# Patient Record
Sex: MALE | Race: WHITE | NOT HISPANIC OR LATINO | Employment: FULL TIME | URBAN - METROPOLITAN AREA
[De-identification: names, ages, dates, MRNs, and addresses within clinical notes are randomized per-mention and may not be internally consistent; named-entity substitution may affect disease eponyms.]

---

## 2017-02-06 RX ORDER — NEBIVOLOL HYDROCHLORIDE 10 MG/1
TABLET ORAL
Qty: 90 TABLET | Refills: 1 | Status: SHIPPED | OUTPATIENT
Start: 2017-02-06 | End: 2017-02-07 | Stop reason: SDUPTHER

## 2017-02-07 RX ORDER — NEBIVOLOL 10 MG/1
10 TABLET ORAL DAILY
Qty: 90 TABLET | Refills: 1 | Status: SHIPPED | OUTPATIENT
Start: 2017-02-07 | End: 2017-08-14 | Stop reason: SDUPTHER

## 2017-06-09 RX ORDER — ATORVASTATIN CALCIUM 40 MG/1
40 TABLET, FILM COATED ORAL DAILY
Qty: 90 TABLET | Refills: 1 | Status: SHIPPED | OUTPATIENT
Start: 2017-06-09 | End: 2017-12-13 | Stop reason: SDUPTHER

## 2017-08-14 RX ORDER — NEBIVOLOL 10 MG/1
10 TABLET ORAL DAILY
Qty: 90 TABLET | Refills: 1 | Status: SHIPPED | OUTPATIENT
Start: 2017-08-14 | End: 2018-02-19 | Stop reason: SDUPTHER

## 2017-10-03 ENCOUNTER — OFFICE VISIT (OUTPATIENT)
Dept: CARDIOLOGY | Facility: CLINIC | Age: 62
End: 2017-10-03

## 2017-10-03 VITALS
SYSTOLIC BLOOD PRESSURE: 158 MMHG | HEART RATE: 82 BPM | BODY MASS INDEX: 39.62 KG/M2 | DIASTOLIC BLOOD PRESSURE: 90 MMHG | WEIGHT: 246.5 LBS | HEIGHT: 66 IN

## 2017-10-03 DIAGNOSIS — I10 ESSENTIAL HYPERTENSION: ICD-10-CM

## 2017-10-03 DIAGNOSIS — R60.0 LOCALIZED EDEMA: ICD-10-CM

## 2017-10-03 DIAGNOSIS — I25.10 CORONARY ARTERY DISEASE INVOLVING NATIVE CORONARY ARTERY OF NATIVE HEART WITHOUT ANGINA PECTORIS: Primary | ICD-10-CM

## 2017-10-03 PROCEDURE — 99214 OFFICE O/P EST MOD 30 MIN: CPT | Performed by: INTERNAL MEDICINE

## 2017-10-03 RX ORDER — NITROGLYCERIN 0.4 MG/1
0.4 TABLET SUBLINGUAL
COMMUNITY
End: 2019-10-23

## 2017-12-13 RX ORDER — ATORVASTATIN CALCIUM 40 MG/1
TABLET, FILM COATED ORAL
Qty: 90 TABLET | Refills: 0 | Status: SHIPPED | OUTPATIENT
Start: 2017-12-13 | End: 2018-03-27 | Stop reason: SDUPTHER

## 2018-02-19 RX ORDER — NEBIVOLOL 10 MG/1
10 TABLET ORAL DAILY
Qty: 90 TABLET | Refills: 3 | Status: SHIPPED | OUTPATIENT
Start: 2018-02-19 | End: 2018-09-20 | Stop reason: SDUPTHER

## 2018-03-27 RX ORDER — ATORVASTATIN CALCIUM 40 MG/1
40 TABLET, FILM COATED ORAL DAILY
Qty: 90 TABLET | Refills: 3 | Status: SHIPPED | OUTPATIENT
Start: 2018-03-27 | End: 2019-05-25 | Stop reason: SDUPTHER

## 2018-09-20 RX ORDER — NEBIVOLOL 10 MG/1
10 TABLET ORAL DAILY
Qty: 90 TABLET | Refills: 3 | Status: SHIPPED | OUTPATIENT
Start: 2018-09-20 | End: 2019-04-08 | Stop reason: SDUPTHER

## 2018-10-17 ENCOUNTER — OFFICE VISIT (OUTPATIENT)
Dept: CARDIOLOGY | Facility: CLINIC | Age: 63
End: 2018-10-17

## 2018-10-17 VITALS
HEIGHT: 66 IN | HEART RATE: 59 BPM | DIASTOLIC BLOOD PRESSURE: 83 MMHG | SYSTOLIC BLOOD PRESSURE: 140 MMHG | WEIGHT: 225 LBS | BODY MASS INDEX: 36.16 KG/M2

## 2018-10-17 DIAGNOSIS — I25.10 CORONARY ARTERY DISEASE INVOLVING NATIVE CORONARY ARTERY OF NATIVE HEART WITHOUT ANGINA PECTORIS: Primary | ICD-10-CM

## 2018-10-17 DIAGNOSIS — E78.5 DYSLIPIDEMIA: ICD-10-CM

## 2018-10-17 DIAGNOSIS — I10 ESSENTIAL HYPERTENSION: ICD-10-CM

## 2018-10-17 PROCEDURE — 99213 OFFICE O/P EST LOW 20 MIN: CPT | Performed by: NURSE PRACTITIONER

## 2019-04-08 RX ORDER — NEBIVOLOL 10 MG/1
10 TABLET ORAL DAILY
Qty: 90 TABLET | Refills: 3 | Status: SHIPPED | OUTPATIENT
Start: 2019-04-08 | End: 2020-04-06

## 2019-05-28 RX ORDER — ATORVASTATIN CALCIUM 40 MG/1
TABLET, FILM COATED ORAL
Qty: 90 TABLET | Refills: 0 | Status: SHIPPED | OUTPATIENT
Start: 2019-05-28 | End: 2019-09-09 | Stop reason: SDUPTHER

## 2019-09-09 RX ORDER — ATORVASTATIN CALCIUM 40 MG/1
TABLET, FILM COATED ORAL
Qty: 90 TABLET | Refills: 3 | Status: SHIPPED | OUTPATIENT
Start: 2019-09-09 | End: 2020-08-21

## 2019-10-23 ENCOUNTER — OFFICE VISIT (OUTPATIENT)
Dept: CARDIOLOGY | Facility: CLINIC | Age: 64
End: 2019-10-23

## 2019-10-23 VITALS
BODY MASS INDEX: 36.64 KG/M2 | HEART RATE: 77 BPM | WEIGHT: 228 LBS | SYSTOLIC BLOOD PRESSURE: 168 MMHG | HEIGHT: 66 IN | DIASTOLIC BLOOD PRESSURE: 82 MMHG | OXYGEN SATURATION: 95 %

## 2019-10-23 DIAGNOSIS — E78.2 MIXED HYPERLIPIDEMIA: ICD-10-CM

## 2019-10-23 DIAGNOSIS — I10 ESSENTIAL HYPERTENSION: Primary | ICD-10-CM

## 2019-10-23 DIAGNOSIS — I25.5 ISCHEMIC CARDIOMYOPATHY: ICD-10-CM

## 2019-10-23 DIAGNOSIS — I25.810 CORONARY ARTERY DISEASE INVOLVING CORONARY BYPASS GRAFT OF NATIVE HEART WITHOUT ANGINA PECTORIS: ICD-10-CM

## 2019-10-23 PROCEDURE — 99214 OFFICE O/P EST MOD 30 MIN: CPT | Performed by: PHYSICIAN ASSISTANT

## 2019-10-23 RX ORDER — LISINOPRIL 5 MG/1
5 TABLET ORAL DAILY
Qty: 90 TABLET | Refills: 3 | Status: SHIPPED | OUTPATIENT
Start: 2019-10-23 | End: 2020-10-19

## 2019-11-13 ENCOUNTER — HOSPITAL ENCOUNTER (OUTPATIENT)
Dept: CARDIOLOGY | Facility: HOSPITAL | Age: 64
Discharge: HOME | End: 2019-11-13
Admitting: PHYSICIAN ASSISTANT

## 2019-11-13 VITALS — WEIGHT: 228 LBS | HEIGHT: 66 IN | BODY MASS INDEX: 36.64 KG/M2

## 2019-11-13 PROCEDURE — 93306 TTE W/DOPPLER COMPLETE: CPT | Performed by: INTERNAL MEDICINE

## 2019-11-13 PROCEDURE — 93306 TTE W/DOPPLER COMPLETE: CPT

## 2019-11-14 LAB
BH CV ECHO MEAS - AO ROOT AREA (BSA CORRECTED): 1.4
BH CV ECHO MEAS - AO ROOT AREA: 7 CM^2
BH CV ECHO MEAS - AO ROOT DIAM: 3 CM
BH CV ECHO MEAS - BSA(HAYCOCK): 2.2 M^2
BH CV ECHO MEAS - BSA: 2.1 M^2
BH CV ECHO MEAS - BZI_BMI: 36.8 KILOGRAMS/M^2
BH CV ECHO MEAS - BZI_METRIC_HEIGHT: 167.6 CM
BH CV ECHO MEAS - BZI_METRIC_WEIGHT: 103.4 KG
BH CV ECHO MEAS - EDV(CUBED): 130.7 ML
BH CV ECHO MEAS - EDV(MOD-SP2): 132 ML
BH CV ECHO MEAS - EDV(MOD-SP4): 161 ML
BH CV ECHO MEAS - EDV(TEICH): 122.4 ML
BH CV ECHO MEAS - EF(CUBED): 64.6 %
BH CV ECHO MEAS - EF(MOD-BP): 55 %
BH CV ECHO MEAS - EF(MOD-SP2): 67.4 %
BH CV ECHO MEAS - EF(MOD-SP4): 64.6 %
BH CV ECHO MEAS - EF(TEICH): 55.9 %
BH CV ECHO MEAS - ESV(CUBED): 46.2 ML
BH CV ECHO MEAS - ESV(MOD-SP2): 43 ML
BH CV ECHO MEAS - ESV(MOD-SP4): 57 ML
BH CV ECHO MEAS - ESV(TEICH): 54 ML
BH CV ECHO MEAS - FS: 29.3 %
BH CV ECHO MEAS - IVS/LVPW: 0.99
BH CV ECHO MEAS - IVSD: 1.2 CM
BH CV ECHO MEAS - LA DIMENSION: 4.6 CM
BH CV ECHO MEAS - LA/AO: 1.5
BH CV ECHO MEAS - LAD MAJOR: 6.3 CM
BH CV ECHO MEAS - LAT PEAK E' VEL: 9.2 CM/SEC
BH CV ECHO MEAS - LATERAL E/E' RATIO: 9.6
BH CV ECHO MEAS - LV DIASTOLIC VOL/BSA (35-75): 76.1 ML/M^2
BH CV ECHO MEAS - LV MASS(C)D: 237.8 GRAMS
BH CV ECHO MEAS - LV MASS(C)DI: 112.5 GRAMS/M^2
BH CV ECHO MEAS - LV SYSTOLIC VOL/BSA (12-30): 27 ML/M^2
BH CV ECHO MEAS - LVIDD: 5.1 CM
BH CV ECHO MEAS - LVIDS: 3.6 CM
BH CV ECHO MEAS - LVLD AP2: 8.6 CM
BH CV ECHO MEAS - LVLD AP4: 8.8 CM
BH CV ECHO MEAS - LVLS AP2: 7.2 CM
BH CV ECHO MEAS - LVLS AP4: 7.6 CM
BH CV ECHO MEAS - LVPWD: 1.2 CM
BH CV ECHO MEAS - MED PEAK E' VEL: 6.4 CM/SEC
BH CV ECHO MEAS - MEDIAL E/E' RATIO: 13.9
BH CV ECHO MEAS - MV A MAX VEL: 118.5 CM/SEC
BH CV ECHO MEAS - MV DEC TIME: 0.2 SEC
BH CV ECHO MEAS - MV E MAX VEL: 90.5 CM/SEC
BH CV ECHO MEAS - MV E/A: 0.76
BH CV ECHO MEAS - PA ACC SLOPE: 898.4 CM/SEC^2
BH CV ECHO MEAS - PA ACC TIME: 0.08 SEC
BH CV ECHO MEAS - PA PR(ACCEL): 44.1 MMHG
BH CV ECHO MEAS - PI END-D VEL: 123.5 CM/SEC
BH CV ECHO MEAS - RAP SYSTOLE: 8 MMHG
BH CV ECHO MEAS - RVSP: 38 MMHG
BH CV ECHO MEAS - SI(CUBED): 39.9 ML/M^2
BH CV ECHO MEAS - SI(MOD-SP2): 42.1 ML/M^2
BH CV ECHO MEAS - SI(MOD-SP4): 49.2 ML/M^2
BH CV ECHO MEAS - SI(TEICH): 32.3 ML/M^2
BH CV ECHO MEAS - SV(CUBED): 84.5 ML
BH CV ECHO MEAS - SV(MOD-SP2): 89 ML
BH CV ECHO MEAS - SV(MOD-SP4): 104 ML
BH CV ECHO MEAS - SV(TEICH): 68.4 ML
BH CV ECHO MEAS - TAPSE (>1.6): 1.5 CM2
BH CV ECHO MEAS - TR MAX PG: 30 MMHG
BH CV ECHO MEAS - TR MAX VEL: 270.5 CM/SEC
BH CV ECHO MEASUREMENTS AVERAGE E/E' RATIO: 11.6
BH CV VAS BP RIGHT ARM: NORMAL MMHG
BH CV XLRA - RV BASE: 4.4 CM
BH CV XLRA - RV LENGTH: 8 CM
BH CV XLRA - RV MID: 3.9 CM
BH CV XLRA - TDI S': 12.9 CM/SEC
LEFT ATRIUM VOLUME INDEX: 45.4 ML/M^2
LEFT ATRIUM VOLUME: 96 ML
LV EF 2D ECHO EST: 55 %
MAXIMAL PREDICTED HEART RATE: 157 BPM
STRESS TARGET HR: 133 BPM

## 2019-11-21 ENCOUNTER — TELEPHONE (OUTPATIENT)
Dept: CARDIOLOGY | Facility: CLINIC | Age: 64
End: 2019-11-21

## 2020-01-14 ENCOUNTER — TELEPHONE (OUTPATIENT)
Dept: CARDIOLOGY | Facility: CLINIC | Age: 65
End: 2020-01-14

## 2020-04-06 RX ORDER — NEBIVOLOL HYDROCHLORIDE 10 MG/1
TABLET ORAL
Qty: 90 TABLET | Refills: 3 | Status: SHIPPED | OUTPATIENT
Start: 2020-04-06 | End: 2021-03-29 | Stop reason: SDUPTHER

## 2020-05-20 ENCOUNTER — TELEPHONE (OUTPATIENT)
Dept: CARDIOLOGY | Facility: CLINIC | Age: 65
End: 2020-05-20

## 2020-06-22 ENCOUNTER — TELEPHONE (OUTPATIENT)
Dept: CARDIOLOGY | Facility: CLINIC | Age: 65
End: 2020-06-22

## 2020-08-21 RX ORDER — ATORVASTATIN CALCIUM 40 MG/1
TABLET, FILM COATED ORAL
Qty: 90 TABLET | Refills: 0 | Status: SHIPPED | OUTPATIENT
Start: 2020-08-21 | End: 2020-11-19 | Stop reason: SDUPTHER

## 2020-10-19 RX ORDER — LISINOPRIL 5 MG/1
TABLET ORAL
Qty: 90 TABLET | Refills: 0 | Status: SHIPPED | OUTPATIENT
Start: 2020-10-19 | End: 2021-01-07

## 2020-10-28 ENCOUNTER — OFFICE VISIT (OUTPATIENT)
Dept: CARDIOLOGY | Facility: CLINIC | Age: 65
End: 2020-10-28

## 2020-10-28 VITALS
SYSTOLIC BLOOD PRESSURE: 148 MMHG | BODY MASS INDEX: 39.21 KG/M2 | HEIGHT: 66 IN | OXYGEN SATURATION: 98 % | WEIGHT: 244 LBS | HEART RATE: 79 BPM | DIASTOLIC BLOOD PRESSURE: 76 MMHG

## 2020-10-28 DIAGNOSIS — I25.810 CORONARY ARTERY DISEASE INVOLVING CORONARY BYPASS GRAFT OF NATIVE HEART WITHOUT ANGINA PECTORIS: Primary | ICD-10-CM

## 2020-10-28 DIAGNOSIS — E78.5 DYSLIPIDEMIA: ICD-10-CM

## 2020-10-28 DIAGNOSIS — E78.2 MIXED HYPERLIPIDEMIA: ICD-10-CM

## 2020-10-28 DIAGNOSIS — I10 ESSENTIAL HYPERTENSION: ICD-10-CM

## 2020-10-28 PROCEDURE — 99214 OFFICE O/P EST MOD 30 MIN: CPT | Performed by: INTERNAL MEDICINE

## 2020-11-19 RX ORDER — ATORVASTATIN CALCIUM 40 MG/1
40 TABLET, FILM COATED ORAL DAILY
Qty: 90 TABLET | Refills: 2 | Status: SHIPPED | OUTPATIENT
Start: 2020-11-19 | End: 2021-08-11

## 2021-01-07 RX ORDER — LISINOPRIL 5 MG/1
TABLET ORAL
Qty: 90 TABLET | Refills: 3 | Status: SHIPPED | OUTPATIENT
Start: 2021-01-07 | End: 2021-11-02

## 2021-03-29 RX ORDER — NEBIVOLOL 10 MG/1
10 TABLET ORAL DAILY
Qty: 90 TABLET | Refills: 3 | Status: SHIPPED | OUTPATIENT
Start: 2021-03-29 | End: 2022-04-04

## 2021-08-11 RX ORDER — ATORVASTATIN CALCIUM 40 MG/1
TABLET, FILM COATED ORAL
Qty: 90 TABLET | Refills: 0 | Status: SHIPPED | OUTPATIENT
Start: 2021-08-11 | End: 2021-11-29

## 2021-11-02 ENCOUNTER — OFFICE VISIT (OUTPATIENT)
Dept: CARDIOLOGY | Facility: CLINIC | Age: 66
End: 2021-11-02

## 2021-11-02 VITALS
SYSTOLIC BLOOD PRESSURE: 170 MMHG | HEART RATE: 78 BPM | DIASTOLIC BLOOD PRESSURE: 92 MMHG | BODY MASS INDEX: 39.05 KG/M2 | WEIGHT: 243 LBS | OXYGEN SATURATION: 96 % | HEIGHT: 66 IN

## 2021-11-02 DIAGNOSIS — I25.810 CORONARY ARTERY DISEASE INVOLVING CORONARY BYPASS GRAFT OF NATIVE HEART WITHOUT ANGINA PECTORIS: Primary | ICD-10-CM

## 2021-11-02 DIAGNOSIS — I25.10 CORONARY ARTERY DISEASE INVOLVING NATIVE CORONARY ARTERY OF NATIVE HEART, UNSPECIFIED WHETHER ANGINA PRESENT: ICD-10-CM

## 2021-11-02 DIAGNOSIS — I10 PRIMARY HYPERTENSION: ICD-10-CM

## 2021-11-02 DIAGNOSIS — E78.5 DYSLIPIDEMIA: ICD-10-CM

## 2021-11-02 DIAGNOSIS — I10 ESSENTIAL HYPERTENSION: ICD-10-CM

## 2021-11-02 PROCEDURE — 93000 ELECTROCARDIOGRAM COMPLETE: CPT | Performed by: INTERNAL MEDICINE

## 2021-11-02 PROCEDURE — 99213 OFFICE O/P EST LOW 20 MIN: CPT | Performed by: INTERNAL MEDICINE

## 2021-11-02 RX ORDER — NITROGLYCERIN 0.4 MG/1
TABLET SUBLINGUAL
Qty: 25 TABLET | Refills: 5 | Status: SHIPPED | OUTPATIENT
Start: 2021-11-02

## 2021-11-29 RX ORDER — ATORVASTATIN CALCIUM 40 MG/1
TABLET, FILM COATED ORAL
Qty: 90 TABLET | Refills: 3 | Status: SHIPPED | OUTPATIENT
Start: 2021-11-29

## 2022-04-04 RX ORDER — NEBIVOLOL 10 MG/1
TABLET ORAL
Qty: 90 TABLET | Refills: 3 | Status: SHIPPED | OUTPATIENT
Start: 2022-04-04

## 2022-04-20 ENCOUNTER — TELEPHONE (OUTPATIENT)
Dept: CARDIOLOGY | Facility: CLINIC | Age: 67
End: 2022-04-20

## 2022-11-01 ENCOUNTER — HOSPITAL ENCOUNTER (OUTPATIENT)
Dept: CARDIOLOGY | Facility: HOSPITAL | Age: 67
Discharge: HOME | End: 2022-11-01
Admitting: INTERNAL MEDICINE

## 2022-11-01 VITALS
HEIGHT: 66 IN | SYSTOLIC BLOOD PRESSURE: 146 MMHG | HEART RATE: 88 BPM | DIASTOLIC BLOOD PRESSURE: 86 MMHG | BODY MASS INDEX: 39.05 KG/M2 | WEIGHT: 243 LBS

## 2022-11-01 DIAGNOSIS — E78.5 DYSLIPIDEMIA: ICD-10-CM

## 2022-11-01 DIAGNOSIS — I10 PRIMARY HYPERTENSION: ICD-10-CM

## 2022-11-01 DIAGNOSIS — I25.10 CORONARY ARTERY DISEASE INVOLVING NATIVE CORONARY ARTERY OF NATIVE HEART, UNSPECIFIED WHETHER ANGINA PRESENT: ICD-10-CM

## 2022-11-01 DIAGNOSIS — I25.810 CORONARY ARTERY DISEASE INVOLVING CORONARY BYPASS GRAFT OF NATIVE HEART WITHOUT ANGINA PECTORIS: ICD-10-CM

## 2022-11-01 DIAGNOSIS — I10 ESSENTIAL HYPERTENSION: ICD-10-CM

## 2022-11-01 PROCEDURE — 78452 HT MUSCLE IMAGE SPECT MULT: CPT | Performed by: INTERNAL MEDICINE

## 2022-11-01 PROCEDURE — A9500 TC99M SESTAMIBI: HCPCS | Performed by: INTERNAL MEDICINE

## 2022-11-01 PROCEDURE — 93018 CV STRESS TEST I&R ONLY: CPT | Performed by: INTERNAL MEDICINE

## 2022-11-01 PROCEDURE — 78452 HT MUSCLE IMAGE SPECT MULT: CPT

## 2022-11-01 PROCEDURE — 0 TECHNETIUM SESTAMIBI: Performed by: INTERNAL MEDICINE

## 2022-11-01 PROCEDURE — 93017 CV STRESS TEST TRACING ONLY: CPT

## 2022-11-01 RX ADMIN — TECHNETIUM TC 99M SESTAMIBI 1 DOSE: 1 INJECTION INTRAVENOUS at 12:34

## 2022-11-01 RX ADMIN — TECHNETIUM TC 99M SESTAMIBI 1 DOSE: 1 INJECTION INTRAVENOUS at 10:20

## 2022-11-02 LAB
BH CV REST NUCLEAR ISOTOPE DOSE: 9.3 MCI
BH CV STRESS BP STAGE 1: NORMAL
BH CV STRESS BP STAGE 2: NORMAL
BH CV STRESS DURATION MIN STAGE 1: 3
BH CV STRESS DURATION MIN STAGE 2: 3
BH CV STRESS DURATION MIN STAGE 3: 1
BH CV STRESS DURATION SEC STAGE 1: 0
BH CV STRESS DURATION SEC STAGE 2: 0
BH CV STRESS DURATION SEC STAGE 3: 51
BH CV STRESS GRADE STAGE 1: 10
BH CV STRESS GRADE STAGE 2: 12
BH CV STRESS GRADE STAGE 3: 14
BH CV STRESS HR STAGE 1: 121
BH CV STRESS HR STAGE 2: 137
BH CV STRESS HR STAGE 3: 155
BH CV STRESS METS STAGE 1: 5
BH CV STRESS METS STAGE 2: 7.5
BH CV STRESS METS STAGE 3: 10
BH CV STRESS NUCLEAR ISOTOPE DOSE: 32.1 MCI
BH CV STRESS O2 STAGE 1: 95
BH CV STRESS O2 STAGE 2: 95
BH CV STRESS O2 STAGE 3: 95
BH CV STRESS PROTOCOL 1: NORMAL
BH CV STRESS RECOVERY BP: NORMAL MMHG
BH CV STRESS RECOVERY HR: 102 BPM
BH CV STRESS RECOVERY O2: 95 %
BH CV STRESS SPEED STAGE 1: 1.7
BH CV STRESS SPEED STAGE 2: 2.5
BH CV STRESS SPEED STAGE 3: 3.4
BH CV STRESS STAGE 1: 1
BH CV STRESS STAGE 2: 2
BH CV STRESS STAGE 3: 3
LV EF NUC BP: 69 %
MAXIMAL PREDICTED HEART RATE: 154 BPM
PERCENT MAX PREDICTED HR: 101.95 %
STRESS BASELINE BP: NORMAL MMHG
STRESS BASELINE HR: 88 BPM
STRESS O2 SAT REST: 98 %
STRESS PERCENT HR: 120 %
STRESS POST ESTIMATED WORKLOAD: 10.1 METS
STRESS POST EXERCISE DUR MIN: 7 MIN
STRESS POST EXERCISE DUR SEC: 51 SEC
STRESS POST O2 SAT PEAK: 95 %
STRESS POST PEAK BP: NORMAL MMHG
STRESS POST PEAK HR: 157 BPM
STRESS TARGET HR: 131 BPM

## 2022-11-09 ENCOUNTER — OFFICE VISIT (OUTPATIENT)
Dept: CARDIOLOGY | Facility: CLINIC | Age: 67
End: 2022-11-09

## 2022-11-09 VITALS
HEIGHT: 66 IN | DIASTOLIC BLOOD PRESSURE: 78 MMHG | WEIGHT: 242 LBS | HEART RATE: 74 BPM | BODY MASS INDEX: 38.89 KG/M2 | SYSTOLIC BLOOD PRESSURE: 132 MMHG

## 2022-11-09 DIAGNOSIS — I10 ESSENTIAL HYPERTENSION: ICD-10-CM

## 2022-11-09 DIAGNOSIS — I25.810 CORONARY ARTERY DISEASE INVOLVING CORONARY BYPASS GRAFT OF NATIVE HEART WITHOUT ANGINA PECTORIS: Primary | ICD-10-CM

## 2022-11-09 DIAGNOSIS — E78.5 DYSLIPIDEMIA: ICD-10-CM

## 2022-11-09 PROCEDURE — 93000 ELECTROCARDIOGRAM COMPLETE: CPT | Performed by: NURSE PRACTITIONER

## 2022-11-09 PROCEDURE — 99214 OFFICE O/P EST MOD 30 MIN: CPT | Performed by: NURSE PRACTITIONER

## 2022-11-09 RX ORDER — LISINOPRIL 10 MG/1
5 TABLET ORAL DAILY
COMMUNITY

## 2023-09-14 RX ORDER — ATORVASTATIN CALCIUM 40 MG/1
40 TABLET, FILM COATED ORAL DAILY
Qty: 90 TABLET | Refills: 0 | Status: SHIPPED | OUTPATIENT
Start: 2023-09-14

## 2023-11-08 ENCOUNTER — OFFICE VISIT (OUTPATIENT)
Dept: CARDIOLOGY | Facility: CLINIC | Age: 68
End: 2023-11-08
Payer: MEDICARE

## 2023-11-08 VITALS
HEART RATE: 72 BPM | OXYGEN SATURATION: 95 % | HEIGHT: 66 IN | DIASTOLIC BLOOD PRESSURE: 78 MMHG | SYSTOLIC BLOOD PRESSURE: 160 MMHG | WEIGHT: 244 LBS | BODY MASS INDEX: 39.21 KG/M2

## 2023-11-08 DIAGNOSIS — I10 ESSENTIAL HYPERTENSION: ICD-10-CM

## 2023-11-08 DIAGNOSIS — I25.810 CORONARY ARTERY DISEASE INVOLVING CORONARY BYPASS GRAFT OF NATIVE HEART WITHOUT ANGINA PECTORIS: Primary | ICD-10-CM

## 2023-11-08 DIAGNOSIS — E78.5 DYSLIPIDEMIA: ICD-10-CM

## 2023-11-08 PROCEDURE — 3078F DIAST BP <80 MM HG: CPT | Performed by: NURSE PRACTITIONER

## 2023-11-08 PROCEDURE — 1160F RVW MEDS BY RX/DR IN RCRD: CPT | Performed by: NURSE PRACTITIONER

## 2023-11-08 PROCEDURE — 99214 OFFICE O/P EST MOD 30 MIN: CPT | Performed by: NURSE PRACTITIONER

## 2023-11-08 PROCEDURE — 3077F SYST BP >= 140 MM HG: CPT | Performed by: NURSE PRACTITIONER

## 2023-11-08 PROCEDURE — 1159F MED LIST DOCD IN RCRD: CPT | Performed by: NURSE PRACTITIONER

## 2023-11-08 RX ORDER — ATORVASTATIN CALCIUM 40 MG/1
40 TABLET, FILM COATED ORAL DAILY
Qty: 90 TABLET | Refills: 4 | Status: SHIPPED | OUTPATIENT
Start: 2023-11-08

## 2023-11-08 RX ORDER — LISINOPRIL 10 MG/1
5 TABLET ORAL DAILY
Qty: 90 TABLET | Refills: 4 | Status: SHIPPED | OUTPATIENT
Start: 2023-11-08

## 2024-11-13 ENCOUNTER — OFFICE VISIT (OUTPATIENT)
Dept: CARDIOLOGY | Facility: CLINIC | Age: 69
End: 2024-11-13
Payer: MEDICARE

## 2024-11-13 VITALS
WEIGHT: 252.8 LBS | SYSTOLIC BLOOD PRESSURE: 130 MMHG | HEIGHT: 66 IN | HEART RATE: 75 BPM | BODY MASS INDEX: 40.63 KG/M2 | OXYGEN SATURATION: 91 % | DIASTOLIC BLOOD PRESSURE: 75 MMHG

## 2024-11-13 DIAGNOSIS — I25.810 CORONARY ARTERY DISEASE INVOLVING CORONARY BYPASS GRAFT OF NATIVE HEART WITHOUT ANGINA PECTORIS: Primary | ICD-10-CM

## 2024-11-13 PROCEDURE — 99213 OFFICE O/P EST LOW 20 MIN: CPT | Performed by: INTERNAL MEDICINE

## 2024-11-13 PROCEDURE — 1159F MED LIST DOCD IN RCRD: CPT | Performed by: INTERNAL MEDICINE

## 2024-11-13 PROCEDURE — 1160F RVW MEDS BY RX/DR IN RCRD: CPT | Performed by: INTERNAL MEDICINE

## 2024-11-13 PROCEDURE — 3075F SYST BP GE 130 - 139MM HG: CPT | Performed by: INTERNAL MEDICINE

## 2024-11-13 PROCEDURE — 3078F DIAST BP <80 MM HG: CPT | Performed by: INTERNAL MEDICINE

## 2024-11-13 RX ORDER — LISINOPRIL 5 MG/1
5 TABLET ORAL DAILY
Qty: 90 TABLET | Refills: 3 | Status: SHIPPED | OUTPATIENT
Start: 2024-11-13

## 2024-11-13 RX ORDER — NITROGLYCERIN 0.4 MG/1
TABLET SUBLINGUAL
Qty: 25 TABLET | Refills: 5 | Status: SHIPPED | OUTPATIENT
Start: 2024-11-13

## 2024-11-13 NOTE — PROGRESS NOTES
Subjective:     Encounter Date:11/13/2024    Primary Care Physician: Bhakti Ramirez MD      Patient ID: Chad Andrade is a 69 y.o. male.    Chief Complaint:Follow-up (1 year )      PROBLEM LIST:   Coronary artery disease:  Abnormal EKG suggesting old inferior  MI.   01/12/2016: Myocardial perfusion study suggesting moderate-in-size, moderate-in-severity inferobasilar fixed defect with a moderate-in-size, moderate-in-severity inferior reversible defect. EF of 42%. Stress was read by Dr. Laurent.  Cardiac catheterization, 01/29/2016, Miguel Angel Barriga MD, revealing an LVEF of 45% with severe multivessel coronary disease.   Status post coronary artery  bypass grafting, 02/05/2016, Gumaro Mai MD with LIMA to the LAD, saphenous vein graft to the distal RCA and a saphenous vein graft to the obtuse marginal 2.   Echocardiogram, 02/06/2016, Chapo Pate MD, revealing an LVEF of 35% to 40% with mild  LVH.  Echo 11/13/2019:  Estimated EF = 55%.  Left ventricular wall thickness is consistent with mild concentric hypertrophy.  Mild mitral valve regurgitation is present.  Left ventricular systolic function is normal  11/2/2022 MPS negative for ischemia.  EF of 69%.  Essential Hypertension.   Dyslipidemia.   Basal cell cancers  with removal.   GERD.  Sg Hx:  Right inguinal hernia repair, tonsillectomy, CABG, wisdom teeth extraction  Obesity         Allergies   Allergen Reactions    Penicillins     Sulfa Antibiotics          Current Outpatient Medications:     aspirin 81 MG tablet, Take 1 tablet by mouth Daily., Disp: , Rfl:     atorvastatin (LIPITOR) 40 MG tablet, Take 1 tablet by mouth Daily., Disp: 90 tablet, Rfl: 4    lisinopril (PRINIVIL,ZESTRIL) 10 MG tablet, Take 0.5 tablets by mouth Daily., Disp: 90 tablet, Rfl: 4    nebivolol (BYSTOLIC) 10 MG tablet, Take 1 tablet by mouth once daily, Disp: 90 tablet, Rfl: 3    nitroglycerin (NITROSTAT) 0.4 MG SL tablet, 1 under the tongue as needed for angina, may repeat q5mins for  "up three doses, Disp: 25 tablet, Rfl: 5        History of Present Illness    Patient returns today for annual follow-up coronary disease.  Since her last visit he has no specific cardiovascular complaints.  He notes some occasional dyspnea while walking up hills, but is sporadic in nature and not consistent like his previous angina.  He has main complaint is of the, and bilateral knee pain which he thinks may be due to a statin.  He is switched to rosuvastatin without any change in his knee pain.    The following portions of the patient's history were reviewed and updated as appropriate: allergies, current medications, past family history, past medical history, past social history, past surgical history and problem list.      Social History     Tobacco Use    Smoking status: Former    Smokeless tobacco: Never    Tobacco comments:     from [age] years old    Substance Use Topics    Alcohol use: Yes     Alcohol/week: 1.0 standard drink of alcohol     Types: 1 Cans of beer per week     Comment: week    Drug use: No         ROS       Objective:   /75   Pulse 75   Ht 167.6 cm (66\")   Wt 115 kg (252 lb 12.8 oz)   SpO2 91%   BMI 40.80 kg/m²         Vitals reviewed.   Constitutional:       Appearance: Well-developed and not in distress.   Neck:      Thyroid: No thyromegaly.      Vascular: No carotid bruit or JVD.   Pulmonary:      Breath sounds: Normal breath sounds.   Cardiovascular:      Regular rhythm.      No gallop. No S3 and S4 gallop.   Pulses:     Intact distal pulses.      Carotid: 2+ bilaterally.     Radial: 2+ bilaterally.  Edema:     Peripheral edema absent.   Abdominal:      General: Bowel sounds are normal.      Palpations: Abdomen is soft. There is no abdominal mass.      Tenderness: There is no abdominal tenderness.   Musculoskeletal:         General: No deformity.      Extremities: No clubbing present.Skin:     General: Skin is warm and dry.      Findings: No rash.   Neurological:      Mental " Status: Alert and oriented to person, place, and time.         Procedures          Assessment:   Assessment & Plan      Diagnoses and all orders for this visit:    1. Coronary artery disease involving coronary bypass graft of native heart without angina pectoris (Primary)      1.  Coronary artery disease, stable no angina 8 years post CABG.  2.  Hypertension well-controlled  3.  Dyslipidemia  4.  Bilateral knee pain, patient is worried that this is his statin, likely due to arthritis  5.  Morbid obesity    Recommendations:  1.  Exercise and weight loss discussed.  2.  Patient may hold his statin for 3 weeks.  If his symptoms do not resolve in that time, he will resume his statin.  3.  If his musculoskeletal plaints resolve after statin discontinuation, we will then attempt to get PCSK9 inhibitor or Leqvio covered by his insurance.  4.  Revisit annually apparent symptom change         Advance Care Planning   ACP discussion was held with the patient during this visit. Patient has an advance directive (not in EMR), copy requested.      Miguel Angel Barriga MD    Dictated utilizing Dragon dictation

## 2025-05-14 ENCOUNTER — OFFICE VISIT (OUTPATIENT)
Dept: FAMILY MEDICINE CLINIC | Facility: CLINIC | Age: 70
End: 2025-05-14
Payer: MEDICARE

## 2025-05-14 VITALS
OXYGEN SATURATION: 96 % | SYSTOLIC BLOOD PRESSURE: 130 MMHG | DIASTOLIC BLOOD PRESSURE: 78 MMHG | HEIGHT: 66 IN | WEIGHT: 243.6 LBS | TEMPERATURE: 98.2 F | HEART RATE: 73 BPM | RESPIRATION RATE: 18 BRPM | BODY MASS INDEX: 39.15 KG/M2

## 2025-05-14 DIAGNOSIS — Z11.59 NEED FOR HEPATITIS C SCREENING TEST: ICD-10-CM

## 2025-05-14 DIAGNOSIS — Z13.29 SCREENING FOR THYROID DISORDER: ICD-10-CM

## 2025-05-14 DIAGNOSIS — Z13.6 SCREENING FOR AAA (ABDOMINAL AORTIC ANEURYSM): ICD-10-CM

## 2025-05-14 DIAGNOSIS — I25.810 CORONARY ARTERY DISEASE INVOLVING CORONARY BYPASS GRAFT OF NATIVE HEART WITHOUT ANGINA PECTORIS: ICD-10-CM

## 2025-05-14 DIAGNOSIS — Z87.891 PERSONAL HISTORY OF TOBACCO USE, PRESENTING HAZARDS TO HEALTH: ICD-10-CM

## 2025-05-14 DIAGNOSIS — I10 PRIMARY HYPERTENSION: ICD-10-CM

## 2025-05-14 DIAGNOSIS — Z12.5 SCREENING PSA (PROSTATE SPECIFIC ANTIGEN): ICD-10-CM

## 2025-05-14 DIAGNOSIS — H60.331 ACUTE SWIMMER'S EAR OF RIGHT SIDE: ICD-10-CM

## 2025-05-14 DIAGNOSIS — Z00.00 ANNUAL PHYSICAL EXAM: Primary | ICD-10-CM

## 2025-05-14 DIAGNOSIS — B07.9 VIRAL WARTS, UNSPECIFIED TYPE: ICD-10-CM

## 2025-05-14 DIAGNOSIS — Z13.1 SCREENING FOR DIABETES MELLITUS (DM): ICD-10-CM

## 2025-05-14 PROBLEM — K46.9 ABDOMINAL HERNIA: Status: ACTIVE | Noted: 2025-05-14

## 2025-05-14 RX ORDER — MV-MIN/FOLIC/VIT K/LYCOP/COQ10 200-100MCG
300 CAPSULE ORAL DAILY
COMMUNITY

## 2025-05-14 RX ORDER — VITAMIN B COMPLEX
1 CAPSULE ORAL DAILY
COMMUNITY
Start: 2016-03-03

## 2025-05-14 RX ORDER — VITAMIN K2 90 MCG
1 CAPSULE ORAL DAILY
COMMUNITY
Start: 2016-02-02

## 2025-05-14 RX ORDER — OFLOXACIN 3 MG/ML
5 SOLUTION AURICULAR (OTIC) DAILY
Qty: 5 ML | Refills: 0 | Status: SHIPPED | OUTPATIENT
Start: 2025-05-14

## 2025-05-14 RX ORDER — FLUTICASONE PROPIONATE 50 MCG
1 SPRAY, SUSPENSION (ML) NASAL DAILY
COMMUNITY
Start: 2025-05-04

## 2025-05-14 RX ORDER — BUTALB/ACETAMINOPHEN/CAFFEINE 50-325-40
1 TABLET ORAL DAILY
COMMUNITY
Start: 2016-03-03

## 2025-05-14 RX ORDER — UBIDECARENONE 75 MG
100 CAPSULE ORAL DAILY
COMMUNITY
Start: 2016-03-03

## 2025-05-14 NOTE — ASSESSMENT & PLAN NOTE
New patient/est care visit, Medicare wellness visit and routine adult preventative maintenance  Reviewed pt's medical hx with him  Appropriate diagnostic testing ordered and will be reviewed in subsequent f/u visit.  Follow-up in 2 to 4 weeks to discuss results, health goals, and address care gaps.    Orders:    CBC Auto Differential; Future    Comprehensive Metabolic Panel; Future    Lipid Panel; Future

## 2025-05-14 NOTE — PROGRESS NOTES
Annual Medicare  Visit and Wellness Exam     Date: 2025  Patient Name: Chad Andrade  : 1955   MRN: [id number]         Subjective      Subjective   The ABCs of the Annual Wellness Visit  Medicare Wellness Visit    Chad Andrade is a 70 y.o. patient who presents for a Medicare Wellness Visit.    The following portions of the patient's history were reviewed and   updated as appropriate: allergies, current medications, past family history, past medical history, past social history, past surgical history, and problem list.    Compared to one year ago, the patient's physical   health is the same.  Compared to one year ago, the patient's mental   health is the same.    Recent Hospitalizations:  He was not admitted to the hospital during the last year.     Current Medical Providers:  Patient Care Team:  Bhakti Monge APRN as PCP - General (Nurse Practitioner)  Miguel Angel Barriga MD as Consulting Physician (Cardiology)    Outpatient Medications Prior to Visit   Medication Sig Dispense Refill    aspirin 81 MG tablet Take 1 tablet by mouth Daily.      b complex vitamins capsule Take 1 capsule by mouth Daily.      Cholecalciferol (Vitamin D3) 1000 units capsule Take 1 capsule by mouth Daily.      Coenzyme Q10 (Co Q-10) 30 MG capsule Take 1 capsule by mouth Daily.      fluticasone (FLONASE) 50 MCG/ACT nasal spray 1 spray by Each Nare route Daily.      lisinopril (PRINIVIL,ZESTRIL) 5 MG tablet Take 1 tablet by mouth Daily. 90 tablet 3    Magnesium Oxide, Laxative, 500 MG tablet Take 1 tablet by mouth Daily.      nebivolol (BYSTOLIC) 10 MG tablet Take 1 tablet by mouth once daily 90 tablet 3    nitroglycerin (NITROSTAT) 0.4 MG SL tablet 1 under the tongue as needed for angina, may repeat q5mins for up three doses 25 tablet 5    Omega-3 Fatty Acids (Fish Oil) 300 MG capsule Take 1 tablet by mouth Daily.      Red Yeast Rice Extract 300 MG capsule Take 300 mg by mouth Daily.      vitamin B-12  "(cyancobalamin) 100 MCG tablet Take 1 tablet by mouth Daily.      atorvastatin (LIPITOR) 40 MG tablet Take 1 tablet by mouth Daily. 90 tablet 4     No facility-administered medications prior to visit.     No opioid medication identified on active medication list. I have reviewed chart for other potential  high risk medication/s and harmful drug interactions in the elderly.      Aspirin is on active medication list. Aspirin use is indicated based on review of current medical condition/s. Pros and cons of this therapy have been discussed today. Benefits of this medication outweigh potential harm.  Patient has been encouraged to continue taking this medication.  .      Patient Active Problem List   Diagnosis    Coronary artery disease involving coronary bypass graft of native heart without angina pectoris    Hypertension    Dyslipidemia    GERD (gastroesophageal reflux disease)    History of basal cell cancer    Inferior MI    Abdominal hernia    Viral warts    Annual physical exam     Advance Care Planning Advance Directive is not on file.  ACP discussion was held with the patient during this visit. Patient has an advance directive (not in EMR), copy requested.            Objective   Vitals:    05/14/25 0932 05/14/25 0949   BP: 140/70 130/78   BP Location: Left arm Left arm   Patient Position: Sitting Sitting   Cuff Size: Large Adult Large Adult   Pulse: 73    Resp: 18    Temp: 98.2 °F (36.8 °C)    TempSrc: Oral    SpO2: 96%    Weight: 110 kg (243 lb 9.6 oz)    Height: 167.6 cm (66\")    PainSc: 0-No pain        Estimated body mass index is 39.32 kg/m² as calculated from the following:    Height as of this encounter: 167.6 cm (66\").    Weight as of this encounter: 110 kg (243 lb 9.6 oz).    Class 2 Severe Obesity (BMI >=35 and <=39.9). Obesity-related health conditions include the following: hypertension, coronary heart disease, and dyslipidemias. Obesity is newly identified. BMI is is above average; BMI management " plan is completed. We discussed portion control and increasing exercise.           Does the patient have evidence of cognitive impairment? No                                                                                                Health  Risk Assessment    Smoking Status:  Social History     Tobacco Use   Smoking Status Former    Current packs/day: 0.00    Average packs/day: 1 pack/day for 4.5 years (4.5 ttl pk-yrs)    Types: Cigarettes    Start date: 1973    Quit date: 1977    Years since quittin.9    Passive exposure: Past   Smokeless Tobacco Never   Tobacco Comments    from [age] years old      Alcohol Consumption:  Social History     Substance and Sexual Activity   Alcohol Use Yes    Alcohol/week: 1.0 standard drink of alcohol    Types: 1 Cans of beer per week    Comment: very seldom drink alcohol       Fall Risk Screen  STEADI Fall Risk Assessment was completed, and patient is at LOW risk for falls.Assessment completed on:2025    Depression Screening   Little interest or pleasure in doing things? Not at all   Feeling down, depressed, or hopeless? Not at all   PHQ-2 Total Score 0      Health Habits and Functional and Cognitive Screenin/14/2025    10:00 AM   Functional & Cognitive Status   Do you have difficulty preparing food and eating? No   Do you have difficulty bathing yourself, getting dressed or grooming yourself? No   Do you have difficulty using the toilet? No   Do you have difficulty moving around from place to place? No   Do you have trouble with steps or getting out of a bed or a chair? No   Current Diet Well Balanced Diet   Dental Exam Up to date   Eye Exam Up to date   Exercise (times per week) 3 times per week   Current Exercises Include Yard Work   Do you need help using the phone?  No   Are you deaf or do you have serious difficulty hearing?  No   Do you need help to go to places out of walking distance? No   Do you need help shopping? No   Do you need help  preparing meals?  No   Do you need help with housework?  No   Do you need help with laundry? No   Do you need help taking your medications? No   Do you need help managing money? No   Do you ever drive or ride in a car without wearing a seat belt? No   Have you felt unusual stress, anger or loneliness in the last month? No   Who do you live with? Spouse   If you need help, do you have trouble finding someone available to you? No   Have you been bothered in the last four weeks by sexual problems? No   Do you have difficulty concentrating, remembering or making decisions? No           Age-appropriate Screening Schedule:  Refer to the list below for future screening recommendations based on patient's age, sex and/or medical conditions. Orders for these recommended tests are listed in the plan section. The patient has been provided with a written plan.    Health Maintenance List  Health Maintenance   Topic Date Due    HEPATITIS C SCREENING  Never done    AAA SCREEN ONCE  Never done    COVID-19 Vaccine (3 - 2024-25 season) 05/28/2025 (Originally 9/1/2024)    TDAP/TD VACCINES (1 - Tdap) 11/10/2025 (Originally 4/[birth year]/1975)    INFLUENZA VACCINE  07/01/2025    ANNUAL WELLNESS VISIT  05/14/2026    COLORECTAL CANCER SCREENING  11/15/2032    Pneumococcal Vaccine 50+  Completed    ZOSTER VACCINE  Completed                                                                                                                                                CMS Preventative Services Quick Reference  Risk Factors Identified During Encounter  Dental Screening Recommended  Vision Screening Recommended    The above risks/problems have been discussed with the patient.  Pertinent information has been shared with the patient in the After Visit Summary.  An After Visit Summary and PPPS were made available to the patient.    Follow Up:   Next Medicare Wellness visit to be scheduled in 1 year.         Additional E&M Note during same encounter  "follows:  Patient has additional, significant, and separately identifiable condition(s)/problem(s) that require work above and beyond the Medicare Wellness Visit     Chief Complaint  Establish Care, warts (Both hands), and Corneal Ulcer (Right foot)    Subjective   HPI  Chad is also being seen today for an annual adult preventative physical exam.  He is also establishing care with a new provider and we will complete his Medicare physical as well.  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  He has history of cardiovascular disease including CABG x 3, hypertension, and family history of aneurysm.  He is concerned today about getting wart removal completed on his hands and he also has some concerns about lesions on his feet we will address in subsequent visit.  Patient denies any recent illnesses, injuries, or hospitalizations. Denies falls, unexplained weight change, fevers, chills, or other constitutional symptoms and has no additional questions or concerns at this time.          Warts and corns on feet   Multiple warts on hands/fingers x 2-3 years  - spreading and worse  Previously [facility] - for skin CA but not warts    Social history:  Retired maintenance and electrical with [employer]  Wife recently diagnosed with ovarian CA - going through treatment    Cardiac history:  2/15/2016 - CABG x 3  Stopped his statin medications due to knee pain-he will address this with cardiology  Amy -patient wants statin pen medication    Objective   Vital Signs:  /78 (BP Location: Left arm, Patient Position: Sitting, Cuff Size: Large Adult)   Pulse 73   Temp 98.2 °F (36.8 °C) (Oral)   Resp 18   Ht 167.6 cm (66\")   Wt 110 kg (243 lb 9.6 oz)   SpO2 96%   BMI 39.32 kg/m²     Physical Exam  Vitals and nursing note reviewed.   Constitutional:       General: He is not in acute distress.  HENT:      Head: Normocephalic.      Right Ear: Tympanic membrane normal.      Left Ear: Tympanic membrane, ear canal " and external ear normal.      Ears:      Comments: Right ear canal red and irritated     Nose: Nose normal.      Mouth/Throat:      Mouth: Mucous membranes are moist.   Eyes:      Pupils: Pupils are equal, round, and reactive to light.   Neck:      Thyroid: No thyromegaly or thyroid tenderness.   Cardiovascular:      Rate and Rhythm: Normal rate and regular rhythm.      Pulses: Normal pulses.      Heart sounds: Normal heart sounds.   Pulmonary:      Effort: Pulmonary effort is normal.      Breath sounds: Normal breath sounds.   Abdominal:      General: Bowel sounds are normal.      Palpations: Abdomen is soft.   Musculoskeletal:         General: Normal range of motion.      Cervical back: Normal range of motion.      Right lower leg: No edema.      Left lower leg: No edema.   Lymphadenopathy:      Cervical: No cervical adenopathy.   Skin:     General: Skin is warm and dry.      Capillary Refill: Capillary refill takes less than 2 seconds.      Findings: Lesion (Multiple cutaneous warts bilateral hands, > 15 on each hand) present.   Neurological:      Mental Status: He is alert and oriented to person, place, and time.   Psychiatric:         Mood and Affect: Mood normal.         Behavior: Behavior normal.         The following data was reviewed by: RACHEL Cifuentes on 05/14/2025:  Data reviewed : Consultant notes previous PCP notes           Assessment and Plan Additional age appropriate preventative wellness advice topics were discussed during today's preventative wellness exam(some topics already addressed during AWV portion of the note above):   Nutrition: Discussed nutrition plan with patient. Information shared in after visit summary. Goal is for a well balanced diet to enhance overall health.     Injury Prevention Discussion:  Information shared in after visit summary.           Annual physical exam  New patient/est care visit, Medicare wellness visit and routine adult preventative  maintenance  Reviewed pt's medical hx with him  Appropriate diagnostic testing ordered and will be reviewed in subsequent f/u visit.  Follow-up in  2 to 4 weeks  to discuss results, health goals, and address care gaps.    Orders:    CBC Auto Differential; Future    Comprehensive Metabolic Panel; Future    Lipid Panel; Future    Coronary artery disease involving coronary bypass graft of native heart without angina pectoris  History of inferior wall MI and s/p CABG x 3  Established with Dr. Barriga-cardiology  No change in treatment regimen at this time  Patient to continue follow-up with cardiologist         Viral warts, unspecified type  Patient concerned about multiple warts on bilateral hands  Greater than 15 lesions noted on each hand  Patient denies warts in any other areas only his hands  Reports these have increased in size and number over the past 2 to 3 years  Previously established with [facility] and has had several skin cancers removed but has not previously been seen for warts  Agrees to Derm referral for multiple wart removal         Personal history of tobacco use, presenting hazards to health    Orders:    [place] aaa screen limited; Future    Screening for AAA (abdominal aortic aneurysm)  Family history of aneurysm  Previous smoker  Risks versus benefits of screening discussed during appointment today  Patient agrees to AAA screening    Orders:    [place] aaa screen limited; Future    Need for hepatitis C screening test    Orders:    Hepatitis C Antibody; Future    Acute swimmer's ear of right side  Some redness and irritation right ear canal  Symptoms consistent with otitis externa  No fever, chills, or other constitutional symptoms  Medication sent to patient pharmacy and we will evaluate at routine follow-up unless otherwise indicated    Orders:    ofloxacin (FLOXIN) 0.3 % otic solution; Administer 5 drops to the right ear Daily.    Primary hypertension  Hypertension stable  Taking  medications as directed   Denies missed doses, denies side effects   Does not report any headaches, blurry vision, dizziness, chest pain, shortness of breath, or palpitations.      Plan:  Continue current medications  Patient agrees to monitor home blood pressures   Report back to the office if blood pressures consistently >140/90  Pt verbalizes understanding if he develops chest pain, shortness of breath or other alarm symptoms he should call 911 or go to the ER as appropriate.         Patient verbalizes understanding if he develops high fever, chest pain, shortness of breath or other alarm symptoms he should call 911 or go to the ER as appropriate.       I spent 35 minutes caring for Chad on this date of service. This time includes time spent by me in the following activities:preparing for the visit, reviewing tests, obtaining and/or reviewing a separately obtained history, performing a medically appropriate examination and/or evaluation , ordering medications, tests, or procedures, documenting information in the medical record, and independently interpreting results and communicating that information with the patient/family/caregiver  Follow Up   Return in about 2 weeks (around 5/28/2025) for Follow-up.  Patient was given instructions and counseling regarding his condition or for health maintenance advice. Please see specific information pulled into the AVS if appropriate.    Summary and Plan:  Baseline blood work today, completed AWV and Medicare subsequent  Refer to [facility] for multiple wart removal  Ordered AAA screening-should get call for this in 2 weeks  Patient to return in 2 to 4 weeks to review lab work, address health goals, and additional care gaps      RACHEL Pfeiffer (Libby) [facility]  [address] [place] [id number] [phone]    NOTE TO PATIENT:   The 21st Century Cures Act makes medical notes like these available to patients in the  interest of transparency. However, be advised this is a medical document. It is intended as peer to peer communication. It is written in medical language and may contain abbreviations or verbiage that are unfamiliar. It may appear blunt or direct. Medical documents are intended to carry relevant information, facts as evident, and the clinical opinion of the practitioner.     EMR Dragon/Transcription disclaimer:   Much of this encounter note is an electronic transcription of spoken language to printed text. Electronic transcription of spoken language may permit erroneous, or at times, nonsensical words or phrases to be inadvertently transcribed. Although I have reviewed the note for such errors, some may still exist.

## 2025-05-14 NOTE — ASSESSMENT & PLAN NOTE
Hypertension stable  Taking medications as directed   Denies missed doses, denies side effects   Does not report any headaches, blurry vision, dizziness, chest pain, shortness of breath, or palpitations.      Plan:  Continue current medications  Patient agrees to monitor home blood pressures   Report back to the office if blood pressures consistently >140/90  Pt verbalizes understanding if he develops chest pain, shortness of breath or other alarm symptoms he should call 911 or go to the ER as appropriate.

## 2025-05-14 NOTE — ASSESSMENT & PLAN NOTE
Patient concerned about multiple warts on bilateral hands  Greater than 15 lesions noted on each hand  Patient denies warts in any other areas only his hands  Reports these have increased in size and number over the past 2 to 3 years  Previously established with [facility] and has had several skin cancers removed but has not previously been seen for warts  Agrees to Derm referral for multiple wart removal

## 2025-05-14 NOTE — ASSESSMENT & PLAN NOTE
History of inferior wall MI and s/p CABG x 3  Established with Dr. Barriga-cardiology  No change in treatment regimen at this time  Patient to continue follow-up with cardiologist

## 2025-05-14 NOTE — PATIENT INSTRUCTIONS

## 2025-05-15 LAB
ALBUMIN SERPL-MCNC: 4.3 G/DL (ref 3.9–4.9)
ALP SERPL-CCNC: 85 IU/L (ref 44–121)
ALT SERPL-CCNC: 27 IU/L (ref 0–44)
AST SERPL-CCNC: 19 IU/L (ref 0–40)
BASOPHILS # BLD AUTO: 0 X10E3/UL (ref 0–0.2)
BASOPHILS NFR BLD AUTO: 0 %
BILIRUB SERPL-MCNC: 0.4 MG/DL (ref 0–1.2)
BUN SERPL-MCNC: 18 MG/DL (ref 8–27)
BUN/CREAT SERPL: 17 (ref 10–24)
CALCIUM SERPL-MCNC: 9.5 MG/DL (ref 8.6–10.2)
CHLORIDE SERPL-SCNC: 105 MMOL/L (ref 96–106)
CHOLEST SERPL-MCNC: 178 MG/DL (ref 100–199)
CO2 SERPL-SCNC: 22 MMOL/L (ref 20–29)
CREAT SERPL-MCNC: 1.08 MG/DL (ref 0.76–1.27)
EGFRCR SERPLBLD CKD-EPI 2021: 74 ML/MIN/1.73
EOSINOPHIL # BLD AUTO: 0.1 X10E3/UL (ref 0–0.4)
EOSINOPHIL NFR BLD AUTO: 1 %
ERYTHROCYTE [DISTWIDTH] IN BLOOD BY AUTOMATED COUNT: 12.9 % (ref 11.6–15.4)
GLOBULIN SER CALC-MCNC: 2.4 G/DL (ref 1.5–4.5)
GLUCOSE SERPL-MCNC: 102 MG/DL (ref 70–99)
HBA1C MFR BLD: 6 % (ref 4.8–5.6)
HCT VFR BLD AUTO: 48.7 % (ref 37.5–51)
HCV IGG SERPL QL IA: NON REACTIVE
HDLC SERPL-MCNC: 37 MG/DL
HGB BLD-MCNC: 15.8 G/DL (ref 13–17.7)
IMM GRANULOCYTES # BLD AUTO: 0.1 X10E3/UL (ref 0–0.1)
IMM GRANULOCYTES NFR BLD AUTO: 1 %
LDLC SERPL CALC-MCNC: 120 MG/DL (ref 0–99)
LYMPHOCYTES # BLD AUTO: 1.9 X10E3/UL (ref 0.7–3.1)
LYMPHOCYTES NFR BLD AUTO: 22 %
MCH RBC QN AUTO: 31 PG (ref 26.6–33)
MCHC RBC AUTO-ENTMCNC: 32.4 G/DL (ref 31.5–35.7)
MCV RBC AUTO: 96 FL (ref 79–97)
MONOCYTES # BLD AUTO: 1.1 X10E3/UL (ref 0.1–0.9)
MONOCYTES NFR BLD AUTO: 13 %
NEUTROPHILS # BLD AUTO: 5.5 X10E3/UL (ref 1.4–7)
NEUTROPHILS NFR BLD AUTO: 63 %
PLATELET # BLD AUTO: 202 X10E3/UL (ref 150–450)
POTASSIUM SERPL-SCNC: 4.4 MMOL/L (ref 3.5–5.2)
PROT SERPL-MCNC: 6.7 G/DL (ref 6–8.5)
PSA SERPL-MCNC: 3.8 NG/ML (ref 0–4)
RBC # BLD AUTO: 5.1 X10E6/UL (ref 4.14–5.8)
SODIUM SERPL-SCNC: 141 MMOL/L (ref 134–144)
TRIGL SERPL-MCNC: 116 MG/DL (ref 0–149)
TSH SERPL DL<=0.005 MIU/L-ACNC: 0.98 UIU/ML (ref 0.45–4.5)
VLDLC SERPL CALC-MCNC: 21 MG/DL (ref 5–40)
WBC # BLD AUTO: 8.7 X10E3/UL (ref 3.4–10.8)

## 2025-05-19 ENCOUNTER — RESULTS FOLLOW-UP (OUTPATIENT)
Dept: FAMILY MEDICINE CLINIC | Facility: CLINIC | Age: 70
End: 2025-05-19
Payer: MEDICARE

## 2025-05-28 ENCOUNTER — OFFICE VISIT (OUTPATIENT)
Dept: FAMILY MEDICINE CLINIC | Facility: CLINIC | Age: 70
End: 2025-05-28
Payer: MEDICARE

## 2025-05-28 VITALS
BODY MASS INDEX: 39.41 KG/M2 | DIASTOLIC BLOOD PRESSURE: 68 MMHG | WEIGHT: 245.2 LBS | TEMPERATURE: 97.8 F | OXYGEN SATURATION: 97 % | HEART RATE: 66 BPM | HEIGHT: 66 IN | SYSTOLIC BLOOD PRESSURE: 140 MMHG

## 2025-05-28 DIAGNOSIS — L84 CALLUS OF FOOT: ICD-10-CM

## 2025-05-28 DIAGNOSIS — E78.5 DYSLIPIDEMIA: Primary | ICD-10-CM

## 2025-05-28 DIAGNOSIS — R73.03 PREDIABETES: ICD-10-CM

## 2025-05-28 DIAGNOSIS — B07.9 VIRAL WARTS, UNSPECIFIED TYPE: ICD-10-CM

## 2025-05-28 PROBLEM — N40.0 BENIGN PROSTATIC HYPERPLASIA WITHOUT LOWER URINARY TRACT SYMPTOMS: Status: ACTIVE | Noted: 2025-05-28

## 2025-05-28 PROBLEM — K11.20 SIALOADENITIS: Status: ACTIVE | Noted: 2025-05-28

## 2025-05-28 PROBLEM — J30.9 ALLERGIC RHINITIS: Status: ACTIVE | Noted: 2025-05-28

## 2025-05-28 NOTE — ASSESSMENT & PLAN NOTE
Reviewed most recent fasting lipid panel  Discussed diet and lifestyle changes to improve numbers  We discussed statin medications at last visit-patient stopped on his own and plans to address with cardiology

## 2025-05-28 NOTE — PROGRESS NOTES
Office Visit      Date:  2025   Patient Name: Chad Andrade  : 1955   MRN: [id number]     Chief Complaint   Patient presents with   • Labs Only     Discuss labs        History of Present Illness: Chad Andrade is a 70 y.o. male who is here today for routine follow-up and to review most recent lab results.  We will also discuss health goals and address additional care gaps.  He is still waiting for call from dermatology to set up appointment to address multiple warts on his hands.  He would also like to discuss referral to podiatrist to address issues with his feet.  He has flat arches and also is concerned about calluses.  Patient denies any new illnesses, injuries, or hospitalizations since last visit. Denies falls, unexplained weight change, fevers, chills, or other constitutional symptoms and has no additional questions or concerns at this time.    Answers submitted by the patient for this visit:  Problem not listed (Submitted on 2025)  Chief Complaint: Other medical problem  Reason for appointment: Annual physical & blood tests result evaluation      Subjective      Past Medical History:   Diagnosis Date   • Allergic 2018    Seasonal   • Arthritis    • CAD (coronary artery disease)    • Cellulitis 2016    Onset Date:      • Coronary artery disease 2016   • Dyslipidemia 2016   • GERD (gastroesophageal reflux disease) 2016   • History of basal cell cancer    • History of echocardiogram     2016: Echocardiogram read by Dr. Laurent with normal LV wall motion. No significant valve disease. Echocardiogram, 2016, Chapo Pate MD, revealing an LVEF of 35% to 40% with mild LVH.   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    • Inguinal hernia    • Malignant neoplasm of skin    • Obesity        Past Surgical History:   Procedure Laterality Date   • BASAL CELL CARCINOMA EXCISION      • CARDIAC CATHETERIZATION  2016    Cardiac catheterization, 2016, Miguel Angel Barriga MD, revealing an LVEF of 45% with severe multivessel coronary disease.    • CARDIAC SURGERY  2016    Triple bypass   • COLONOSCOPY     • CORONARY ARTERY BYPASS GRAFT  2016    Status post coronary artery bypass grafting, 2016, Gumaro Mai MD with LIMA to the LAD, saphenous vein graft to the distal RCA and a saphenous vein graft to the obtuse marginal 2.    • INGUINAL HERNIA REPAIR Right    • INGUINAL HERNIA REPAIR     • KNEE SURGERY     • MOUTH SURGERY      Tooth Extraction [employer]   • OTHER SURGICAL HISTORY      tonsillectomy with adenoidectomy   • TONSILLECTOMY     • VEIN SURGERY         Family History   Problem Relation Age of Onset   • Colon cancer Mother    • Heart failure Mother    • Coronary artery disease Mother    • Diabetes Mother    • Diabetes Father    • Hypertension Father    • Lung disease Father    • Aneurysm Father    • COPD Father    • Arthritis Sister    • Breast cancer Sister         Breast   • Diabetes Sister         Type2   • Diabetes Brother         Type2   • Heart disease Brother    • Heart disease Brother    • Hyperlipidemia Brother    • Hypertension Brother    • No Known Problems Maternal Grandmother    • No Known Problems Maternal Grandfather    • No Known Problems Paternal Grandmother    • No Known Problems Paternal Grandfather        Social History     Socioeconomic History   • Marital status:    Tobacco Use   • Smoking status: Former     Current packs/day: 0.00     Average packs/day: 1 pack/day for 4.5 years (4.5 ttl pk-yrs)     Types: Cigarettes     Start date: 1973     Quit date: 1977     Years since quittin.9     Passive exposure: Past   • Smokeless tobacco: Never   • Tobacco comments:     from [age] years old    Vaping Use   • Vaping status: Never Used   Substance and Sexual Activity   • Alcohol use: Yes     Alcohol/week: 1.0 standard drink of  alcohol     Types: 1 Cans of beer per week     Comment: very seldom drink alcohol   • Drug use: No   • Sexual activity: Not Currently     Partners: Female       Current Outpatient Medications   Medication Instructions   • aspirin 81 mg, Daily   • b complex vitamins capsule 1 capsule, Daily   • Cholecalciferol (Vitamin D3) 1000 units capsule 1 capsule, Daily   • Coenzyme Q10 (Co Q-10) 30 MG capsule 1 capsule, Daily   • fluticasone (FLONASE) 50 MCG/ACT nasal spray 1 spray, Daily   • lisinopril (PRINIVIL,ZESTRIL) 5 mg, Oral, Daily   • nebivolol (BYSTOLIC) 10 MG tablet Take 1 tablet by mouth once daily   • nitroglycerin (NITROSTAT) 0.4 MG SL tablet 1 under the tongue as needed for angina, may repeat q5mins for up three doses   • ofloxacin (FLOXIN) 0.3 % otic solution 5 drops, Right Ear, Daily   • Omega-3 Fatty Acids (Fish Oil) 300 MG capsule 1 tablet, Daily   • Red Yeast Rice Extract 300 mg, Daily   • vitamin B-12 (CYANOCOBALAMIN) 100 mcg, Daily       Allergies   Allergen Reactions   • Penicillins Anaphylaxis   • Sulfa Antibiotics Swelling       Objective     Physical Exam  Vitals and nursing note reviewed.   Constitutional:       General: He is not in acute distress.  HENT:      Head: Normocephalic.      Right Ear: Tympanic membrane, ear canal and external ear normal.      Left Ear: Tympanic membrane, ear canal and external ear normal.      Nose: Nose normal.      Mouth/Throat:      Mouth: Mucous membranes are moist.   Eyes:      Pupils: Pupils are equal, round, and reactive to light.   Neck:      Thyroid: No thyromegaly or thyroid tenderness.   Cardiovascular:      Rate and Rhythm: Normal rate and regular rhythm.      Pulses: Normal pulses.      Heart sounds: Normal heart sounds.   Pulmonary:      Effort: Pulmonary effort is normal.      Breath sounds: Normal breath sounds.   Abdominal:      General: Bowel sounds are normal.      Palpations: Abdomen is soft.   Musculoskeletal:         General: Normal range of motion.  "     Cervical back: Normal range of motion.      Right lower leg: No edema.      Left lower leg: No edema.   Lymphadenopathy:      Cervical: No cervical adenopathy.   Skin:     General: Skin is warm and dry.      Capillary Refill: Capillary refill takes less than 2 seconds.      Findings: Lesion (Multiple cutaneous warts bilateral hands, > 15 on each hand) present.   Neurological:      Mental Status: He is alert and oriented to person, place, and time.   Psychiatric:         Mood and Affect: Mood normal.         Behavior: Behavior normal.       Vitals:    05/28/25 0956   BP: 140/68   Pulse: 66   Temp: 97.8 °F (36.6 °C)   SpO2: 97%   Weight: 111 kg (245 lb 3.2 oz)   Height: 167.6 cm (66\")   PainSc: 0-No pain     Body mass index is 39.58 kg/m².     The ASCVD Risk score (Gustavo العلي, et al., 2019) failed to calculate for the following reasons:    Risk score cannot be calculated because patient has a medical history suggesting prior/existing ASCVD    CMP          5/14/2025    10:40   CMP   Glucose 102    BUN 18    Creatinine 1.08    EGFR 74    Sodium 141    Potassium 4.4    Chloride 105    Calcium 9.5    Total Protein 6.7    Albumin 4.3    Globulin 2.4    Total Bilirubin 0.4    Alkaline Phosphatase 85    AST (SGOT) 19    ALT (SGPT) 27    BUN/Creatinine Ratio 17      CBC w/diff          5/14/2025    10:40   CBC w/Diff   WBC 8.7    RBC 5.10    Hemoglobin 15.8    Hematocrit 48.7    MCV 96    MCH 31.0    MCHC 32.4    RDW 12.9    Platelets 202    Neutrophil Rel % 63    Lymphocyte Rel % 22    Monocyte Rel % 13    Eosinophil Rel % 1    Basophil Rel % 0      Lipid Panel          5/14/2025    10:40   Lipid Panel   Total Cholesterol 178    Triglycerides 116    HDL Cholesterol 37    VLDL Cholesterol 21    LDL Cholesterol  120      TSH          5/14/2025    10:40   TSH   TSH 0.976      Most Recent A1C          5/14/2025    10:40   HGBA1C Most Recent   Hemoglobin A1C 6.0        Assessment / Plan         Dyslipidemia  Reviewed most " recent fasting lipid panel  Discussed diet and lifestyle changes to improve numbers  We discussed statin medications at last visit-patient stopped on his own and plans to address with cardiology       Prediabetes  Most recent hemoglobin A1c 6.0%  Discussed with patient: result indicates average blood sugars are in pre-diabetes range  Recommend ADA diet and exercise recommendations  Patient education sheet provided today with information about  diabetes prevention program    Plan:  Diet and lifestyle changes to prevent progression to DM 2  Re-check HgbA1c in 6 months unless otherwise indicated       Callus of foot  Patient concerned about callus on right foot  He also has issues with flat arches  Patient agrees to referral to podiatrist  Patient to follow-up with PCP as needed or we will evaluate at routine wellness visit unless otherwise indicated    Orders:  •  Ambulatory Referral to Podiatry    Viral warts, unspecified type  Multiple warts on hands/fingers x 2-3 years  - spreading and worse  Previously DAK - for skin CA but not warts    Orders:  •  Ambulatory Referral to Dermatology       Pt would like to change US (AAA) scan to [facility] instead of in [facility]       Patient Education:   Reviewed medications, potential side effects and signs and symptoms to report.   Discussed risk vs benefits of treatment plan with patient including medications, labs, and imaging.    Patient education materials attached to AVS and reviewed with patient during office visit today.   Addressed questions and concerns during visit. Patient verbalized understanding and agrees with tx plan.   Instructed to call the office with any questions and report to ER with any life-threatening symptoms.    Return in about 6 months (around 11/28/2025) for Recheck.    RACHEL Pfeiffer (Libby)  [facility]   [address] [address] [phone]    NOTE TO PATIENT: The 21st Century Cures Act makes  medical notes like these available to patients in the interest of transparency. However, be advised this is a medical document. It is intended as peer to peer communication. It is written in medical language and may contain abbreviations or verbiage that are unfamiliar. It may appear blunt or direct. Medical documents are intended to carry relevant information, facts as evident, and the clinical opinion of the practitioner.      EMR Dragon/Transcription disclaimer: Much of this encounter note is an electronic transcription of spoken language to printed text. Electronic transcription of spoken language may permit erroneous, or at times, nonsensical words or phrases to be inadvertently transcribed. Although I have reviewed the note for such errors, some may still exist.

## 2025-05-28 NOTE — PATIENT INSTRUCTIONS

## 2025-05-28 NOTE — ASSESSMENT & PLAN NOTE
Multiple warts on hands/fingers x 2-3 years  - spreading and worse  Previously DAK - for skin CA but not warts    Orders:    Ambulatory Referral to Dermatology

## 2025-05-28 NOTE — ASSESSMENT & PLAN NOTE
Patient concerned about callus on right foot  He also has issues with flat arches  Patient agrees to referral to podiatrist  Patient to follow-up with PCP as needed or we will evaluate at routine wellness visit unless otherwise indicated    Orders:    Ambulatory Referral to Podiatry

## 2025-05-29 ENCOUNTER — TELEPHONE (OUTPATIENT)
Dept: FAMILY MEDICINE CLINIC | Facility: CLINIC | Age: 70
End: 2025-05-29
Payer: MEDICARE

## 2025-05-29 NOTE — TELEPHONE ENCOUNTER
Contacted pt by telephone and let him know I spoke to our referral coordinator and she provided a contact number he is able to call himself to reschedule his AAA screening and change the location of his testing to his preferred location as we discussed in his previous appt. The following number was provided for him to call [phone].  Patient agrees to follow-up with the office if he has any difficulty getting that changed.

## 2025-07-05 ENCOUNTER — HOSPITAL ENCOUNTER (OUTPATIENT)
Facility: HOSPITAL | Age: 70
Discharge: HOME | End: 2025-07-05
Payer: MEDICARE

## 2025-07-05 DIAGNOSIS — Z87.891 PERSONAL HISTORY OF TOBACCO USE, PRESENTING HAZARDS TO HEALTH: ICD-10-CM

## 2025-07-05 DIAGNOSIS — Z13.6 SCREENING FOR AAA (ABDOMINAL AORTIC ANEURYSM): ICD-10-CM

## 2025-07-05 PROCEDURE — 76706 US ABDL AORTA SCREEN AAA: CPT

## 2025-08-19 ENCOUNTER — TELEPHONE (OUTPATIENT)
Dept: FAMILY MEDICINE CLINIC | Facility: CLINIC | Age: 70
End: 2025-08-19
Payer: MEDICARE